# Patient Record
Sex: MALE | Race: WHITE | NOT HISPANIC OR LATINO | Employment: STUDENT | URBAN - METROPOLITAN AREA
[De-identification: names, ages, dates, MRNs, and addresses within clinical notes are randomized per-mention and may not be internally consistent; named-entity substitution may affect disease eponyms.]

---
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
## 2023-04-26 NOTE — TELEPHONE ENCOUNTER
----- Message from Serina Solorio sent at 3/31/2017  9:06 AM CDT -----  Arlyn Arreguin / pt was seen yesterday ... Dr referred pt to an ortho Dr but cannot see him soon enough for a cast .. Please advise   
Called mom(Arlyn) and informed her of xray results and Dr. Rosas's message. Mom stated thanks and she will try to make an appt tomorrow.   
Please try to get patient in ASAP with Dr Salguero  
Radiologist confirmed fracture at base of the thumb where we noted on xray.  We probably should have him see the orthopedist next week and it would likely need casting.  i will place referral.    Gaye Carrasquillo MD   [address] [address]   [facility]   [facility] [address]   Phone: [phone]   Fax: [phone]       
Hypertension